# Patient Record
Sex: MALE | Race: WHITE | NOT HISPANIC OR LATINO | Employment: UNEMPLOYED | URBAN - METROPOLITAN AREA
[De-identification: names, ages, dates, MRNs, and addresses within clinical notes are randomized per-mention and may not be internally consistent; named-entity substitution may affect disease eponyms.]

---
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
## 2022-10-07 ENCOUNTER — HOSPITAL ENCOUNTER (EMERGENCY)
Facility: HOSPITAL | Age: 22
Discharge: HOME | End: 2022-10-07
Attending: EMERGENCY MEDICINE
Payer: COMMERCIAL

## 2022-10-07 ENCOUNTER — APPOINTMENT (OUTPATIENT)
Dept: RADIOLOGY | Facility: HOSPITAL | Age: 22
End: 2022-10-07
Payer: COMMERCIAL

## 2022-10-07 VITALS
BODY MASS INDEX: 17.61 KG/M2 | HEIGHT: 72 IN | OXYGEN SATURATION: 100 % | RESPIRATION RATE: 18 BRPM | SYSTOLIC BLOOD PRESSURE: 115 MMHG | DIASTOLIC BLOOD PRESSURE: 65 MMHG | HEART RATE: 82 BPM | WEIGHT: 130 LBS | TEMPERATURE: 98.2 F

## 2022-10-07 DIAGNOSIS — R07.89 INTERMITTENT LEFT-SIDED CHEST PAIN: Primary | ICD-10-CM

## 2022-10-07 LAB
ALBUMIN SERPL BCP-MCNC: 4.3 G/DL (ref 3.5–5)
ALP SERPL-CCNC: 100 U/L (ref 46–116)
ALT SERPL W P-5'-P-CCNC: 19 U/L (ref 12–78)
ANION GAP SERPL CALCULATED.3IONS-SCNC: 8 MMOL/L (ref 4–13)
AST SERPL W P-5'-P-CCNC: 23 U/L (ref 5–45)
ATRIAL RATE: 96 BPM
BASOPHILS # BLD AUTO: 0.03 THOUSANDS/ΜL (ref 0–0.1)
BASOPHILS NFR BLD AUTO: 1 % (ref 0–1)
BILIRUB DIRECT SERPL-MCNC: 0.19 MG/DL (ref 0–0.2)
BILIRUB SERPL-MCNC: 0.89 MG/DL (ref 0.2–1)
BUN SERPL-MCNC: 13 MG/DL (ref 5–25)
CALCIUM SERPL-MCNC: 8.7 MG/DL (ref 8.3–10.1)
CARDIAC TROPONIN I PNL SERPL HS: <2 NG/L
CHLORIDE SERPL-SCNC: 102 MMOL/L (ref 96–108)
CO2 SERPL-SCNC: 26 MMOL/L (ref 21–32)
CREAT SERPL-MCNC: 0.8 MG/DL (ref 0.6–1.3)
EOSINOPHIL # BLD AUTO: 0.08 THOUSAND/ΜL (ref 0–0.61)
EOSINOPHIL NFR BLD AUTO: 2 % (ref 0–6)
ERYTHROCYTE [DISTWIDTH] IN BLOOD BY AUTOMATED COUNT: 11.6 % (ref 11.6–15.1)
GFR SERPL CREATININE-BSD FRML MDRD: 128 ML/MIN/1.73SQ M
GLUCOSE SERPL-MCNC: 100 MG/DL (ref 65–140)
HCT VFR BLD AUTO: 40.2 % (ref 36.5–49.3)
HGB BLD-MCNC: 13.6 G/DL (ref 12–17)
IMM GRANULOCYTES # BLD AUTO: 0.02 THOUSAND/UL (ref 0–0.2)
IMM GRANULOCYTES NFR BLD AUTO: 0 % (ref 0–2)
LYMPHOCYTES # BLD AUTO: 0.96 THOUSANDS/ΜL (ref 0.6–4.47)
LYMPHOCYTES NFR BLD AUTO: 19 % (ref 14–44)
MAGNESIUM SERPL-MCNC: 2 MG/DL (ref 1.6–2.6)
MCH RBC QN AUTO: 29.3 PG (ref 26.8–34.3)
MCHC RBC AUTO-ENTMCNC: 33.8 G/DL (ref 31.4–37.4)
MCV RBC AUTO: 87 FL (ref 82–98)
MONOCYTES # BLD AUTO: 0.5 THOUSAND/ΜL (ref 0.17–1.22)
MONOCYTES NFR BLD AUTO: 10 % (ref 4–12)
NEUTROPHILS # BLD AUTO: 3.38 THOUSANDS/ΜL (ref 1.85–7.62)
NEUTS SEG NFR BLD AUTO: 68 % (ref 43–75)
NRBC BLD AUTO-RTO: 0 /100 WBCS
P AXIS: 72 DEGREES
PLATELET # BLD AUTO: 243 THOUSANDS/UL (ref 149–390)
PMV BLD AUTO: 10.7 FL (ref 8.9–12.7)
POTASSIUM SERPL-SCNC: 4 MMOL/L (ref 3.5–5.3)
PR INTERVAL: 152 MS
PROT SERPL-MCNC: 7.8 G/DL (ref 6.4–8.4)
QRS AXIS: 92 DEGREES
QRSD INTERVAL: 90 MS
QT INTERVAL: 362 MS
QTC INTERVAL: 457 MS
RBC # BLD AUTO: 4.64 MILLION/UL (ref 3.88–5.62)
SODIUM SERPL-SCNC: 136 MMOL/L (ref 135–147)
T WAVE AXIS: 71 DEGREES
VENTRICULAR RATE: 96 BPM
WBC # BLD AUTO: 4.97 THOUSAND/UL (ref 4.31–10.16)

## 2022-10-07 PROCEDURE — 84484 ASSAY OF TROPONIN QUANT: CPT | Performed by: EMERGENCY MEDICINE

## 2022-10-07 PROCEDURE — 99285 EMERGENCY DEPT VISIT HI MDM: CPT | Performed by: EMERGENCY MEDICINE

## 2022-10-07 PROCEDURE — 80076 HEPATIC FUNCTION PANEL: CPT | Performed by: EMERGENCY MEDICINE

## 2022-10-07 PROCEDURE — 85025 COMPLETE CBC W/AUTO DIFF WBC: CPT | Performed by: EMERGENCY MEDICINE

## 2022-10-07 PROCEDURE — 93010 ELECTROCARDIOGRAM REPORT: CPT | Performed by: INTERNAL MEDICINE

## 2022-10-07 PROCEDURE — 83735 ASSAY OF MAGNESIUM: CPT | Performed by: EMERGENCY MEDICINE

## 2022-10-07 PROCEDURE — 36415 COLL VENOUS BLD VENIPUNCTURE: CPT | Performed by: EMERGENCY MEDICINE

## 2022-10-07 PROCEDURE — 96374 THER/PROPH/DIAG INJ IV PUSH: CPT

## 2022-10-07 PROCEDURE — 71046 X-RAY EXAM CHEST 2 VIEWS: CPT

## 2022-10-07 PROCEDURE — 80048 BASIC METABOLIC PNL TOTAL CA: CPT | Performed by: EMERGENCY MEDICINE

## 2022-10-07 PROCEDURE — 99285 EMERGENCY DEPT VISIT HI MDM: CPT

## 2022-10-07 PROCEDURE — 93005 ELECTROCARDIOGRAM TRACING: CPT

## 2022-10-07 PROCEDURE — 96361 HYDRATE IV INFUSION ADD-ON: CPT

## 2022-10-07 RX ORDER — KETOROLAC TROMETHAMINE 30 MG/ML
15 INJECTION, SOLUTION INTRAMUSCULAR; INTRAVENOUS ONCE
Status: COMPLETED | OUTPATIENT
Start: 2022-10-07 | End: 2022-10-07

## 2022-10-07 RX ADMIN — KETOROLAC TROMETHAMINE 15 MG: 30 INJECTION, SOLUTION INTRAMUSCULAR at 13:37

## 2022-10-07 RX ADMIN — SODIUM CHLORIDE 500 ML: 0.9 INJECTION, SOLUTION INTRAVENOUS at 13:37

## 2022-10-07 NOTE — Clinical Note
Anderson Sandhu was seen and treated in our emergency department on 10/7/2022  No restrictions            Diagnosis:     Martha Young  may return to work on return date  He may return on this date: 10/08/2022         If you have any questions or concerns, please don't hesitate to call        Micky Hardy, DO    ______________________________           _______________          _______________  Hospital Representative                              Date                                Time

## 2022-10-07 NOTE — Clinical Note
Desiree Garza was seen and treated in our emergency department on 10/7/2022  No restrictions            Diagnosis:     Demi Edwards  may return to work on return date  He may return on this date: 10/08/2022         If you have any questions or concerns, please don't hesitate to call        Anitha Loaiza DO    ______________________________           _______________          _______________  Hospital Representative                              Date                                Time

## 2022-10-07 NOTE — ED PROVIDER NOTES
History  Chief Complaint   Patient presents with    Chest Pain     C/o intermittent cp for the past 2 wks  Denies sob  Patient is a 72-year-old male with no significant past medical history, presents to the emergency department complaining of left-sided chest pain intermittent for the past 2 weeks  Patient states that 2 weeks ago he was getting over a cold at which time he was mostly having nasal congestion and coughing  He states the cough and congestion have basically resolved but about 2 weeks ago at the end of his cold symptoms he started feeling a sharp chest pain on the left side that would come and go randomly  He states he is getting the pain on and off throughout the day, approximately 3 times per day  The pain comes on suddenly, lasts a couple of seconds and then goes away  He states when it does come on he just pauses, stops taking a deep breath and then it will go away on its own  He states it sometimes is worse when he does take a deep breath  Denies any other alleviating or aggravating factors  Denies having these symptoms before  He denies any associated diaphoresis, fevers or chills, headache, dizziness or near syncope, hemoptysis, palpitations dyspnea, abdominal pain, nausea, vomiting, change in bowel habits, urinary symptoms, skin rash or color change, leg pain or swelling, extremity weakness or paresthesia or other focal neurologic deficits  Denies any personal or family history of venous thromboembolism  Denies any recent prolonged travel or immobilization  Family history of cardiac disease mostly in his grandparents  Denies smoking history        History provided by:  Patient and parent   used: No    Chest Pain  Associated symptoms: no abdominal pain, no back pain, no cough, no dizziness, no fever, no headache, no nausea, no numbness, no palpitations, no shortness of breath, not vomiting and no weakness        Prior to Admission Medications   Prescriptions Last Dose Informant Patient Reported? Taking?   mupirocin (BACTROBAN) 2 % ointment   No No   Sig: Apply topically 3 (three) times a day for 10 days      Facility-Administered Medications: None       History reviewed  No pertinent past medical history  Past Surgical History:   Procedure Laterality Date    CIRCUMCISION         Family History   Problem Relation Age of Onset    No Known Problems Mother     No Known Problems Father     Diabetes Paternal Grandfather     Addiction problem Neg Hx     Mental illness Neg Hx      I have reviewed and agree with the history as documented  E-Cigarette/Vaping     E-Cigarette/Vaping Substances     Social History     Tobacco Use    Smoking status: Never Smoker    Smokeless tobacco: Never Used   Substance Use Topics    Alcohol use: Never    Drug use: Never       Review of Systems   Constitutional: Negative for chills and fever  HENT: Negative for congestion, ear pain, rhinorrhea, sore throat and tinnitus  Respiratory: Negative for cough, chest tightness, shortness of breath and wheezing  Cardiovascular: Positive for chest pain  Negative for palpitations and leg swelling  Gastrointestinal: Negative for abdominal pain, constipation, diarrhea, nausea and vomiting  Genitourinary: Negative for dysuria, flank pain, frequency and hematuria  Musculoskeletal: Negative for back pain, neck pain and neck stiffness  Skin: Negative for color change, pallor, rash and wound  Allergic/Immunologic: Negative for immunocompromised state  Neurological: Negative for dizziness, syncope, weakness, light-headedness, numbness and headaches  Hematological: Negative for adenopathy  Psychiatric/Behavioral: Negative for confusion and decreased concentration  All other systems reviewed and are negative  Physical Exam  Physical Exam  Vitals and nursing note reviewed  Constitutional:       General: He is not in acute distress  Appearance: Normal appearance   He is well-developed  He is not ill-appearing, toxic-appearing or diaphoretic  HENT:      Head: Normocephalic and atraumatic  Right Ear: External ear normal       Left Ear: External ear normal       Mouth/Throat:      Comments: Orpharyngeal exam deferred at this time due to risk of exposure to COVID-19 during current pandemic  Patient has no oropharyngeal complaints  Eyes:      Extraocular Movements: Extraocular movements intact  Conjunctiva/sclera: Conjunctivae normal    Neck:      Vascular: No JVD  Cardiovascular:      Rate and Rhythm: Normal rate and regular rhythm  Pulses: Normal pulses  Heart sounds: Normal heart sounds  No murmur heard  No friction rub  No gallop  Pulmonary:      Effort: Pulmonary effort is normal  No respiratory distress  Breath sounds: Normal breath sounds  No wheezing, rhonchi or rales  Chest:      Chest wall: No tenderness  Abdominal:      General: There is no distension  Palpations: Abdomen is soft  Tenderness: There is no abdominal tenderness  There is no guarding or rebound  Musculoskeletal:         General: No swelling or tenderness  Normal range of motion  Cervical back: Normal range of motion and neck supple  No rigidity  Right lower leg: No edema  Left lower leg: No edema  Skin:     General: Skin is warm and dry  Coloration: Skin is not pale  Findings: No erythema or rash  Neurological:      General: No focal deficit present  Mental Status: He is alert and oriented to person, place, and time  Sensory: No sensory deficit  Motor: No weakness     Psychiatric:         Mood and Affect: Mood normal          Behavior: Behavior normal          Vital Signs  ED Triage Vitals [10/07/22 1250]   Temperature Pulse Respirations Blood Pressure SpO2   98 2 °F (36 8 °C) 99 18 139/84 98 %      Temp Source Heart Rate Source Patient Position - Orthostatic VS BP Location FiO2 (%)   Oral Monitor Sitting Left arm -- Pain Score       No Pain         Vitals:    10/07/22 1250 10/07/22 1300   BP: 139/84 134/79   BP Location: Left arm    Pulse: 99 94   Resp: 18 20   Temp: 98 2 °F (36 8 °C)    TempSrc: Oral    SpO2: 98% 99%   Weight: 59 kg (130 lb)    Height: 6' (1 829 m)        Visual Acuity      ED Medications  Medications   sodium chloride 0 9 % bolus 500 mL (500 mL Intravenous New Bag 10/7/22 1337)   ketorolac (TORADOL) injection 15 mg (15 mg Intravenous Given 10/7/22 1337)       Diagnostic Studies  Results Reviewed     Procedure Component Value Units Date/Time    HS Troponin 0hr (reflex protocol) [[id number]]  (Normal) Collected: 10/07/22 1341    Lab Status: Final result Specimen: Blood from Arm, Right Updated: 10/07/22 1410     hs TnI 0hr <2 ng/L     Basic metabolic panel [[id number]] Collected: 10/07/22 1341    Lab Status: Final result Specimen: Blood from Arm, Right Updated: 10/07/22 1404     Sodium 136 mmol/L      Potassium 4 0 mmol/L      Chloride 102 mmol/L      CO2 26 mmol/L      ANION GAP 8 mmol/L      BUN 13 mg/dL      Creatinine 0 80 mg/dL      Glucose 100 mg/dL      Calcium 8 7 mg/dL      eGFR 128 ml/min/1 73sq m     Narrative:      Tanner guidelines for Chronic Kidney Disease (CKD):     Stage 1 with normal or high GFR (GFR > 90 mL/min/1 73 square meters)    Stage 2 Mild CKD (GFR = 60-89 mL/min/1 73 square meters)    Stage 3A Moderate CKD (GFR = 45-59 mL/min/1 73 square meters)    Stage 3B Moderate CKD (GFR = 30-44 mL/min/1 73 square meters)    Stage 4 Severe CKD (GFR = 15-29 mL/min/1 73 square meters)    Stage 5 End Stage CKD (GFR <15 mL/min/1 73 square meters)  Note: GFR calculation is accurate only with a steady state creatinine    Hepatic function panel [[id number]]  (Normal) Collected: 10/07/22 1341    Lab Status: Final result Specimen: Blood from Arm, Right Updated: 10/07/22 1404     Total Bilirubin 0 89 mg/dL      Bilirubin, Direct 0 19 mg/dL      Alkaline Phosphatase 100 U/L AST 23 U/L      ALT 19 U/L      Total Protein 7 8 g/dL      Albumin 4 3 g/dL     Magnesium [[id number]]  (Normal) Collected: 10/07/22 1341    Lab Status: Final result Specimen: Blood from Arm, Right Updated: 10/07/22 1404     Magnesium 2 0 mg/dL     CBC and differential [[id number]] Collected: 10/07/22 1341    Lab Status: Final result Specimen: Blood from Arm, Right Updated: 10/07/22 1346     WBC 4 97 Thousand/uL      RBC 4 64 Million/uL      Hemoglobin 13 6 g/dL      Hematocrit 40 2 %      MCV 87 fL      MCH 29 3 pg      MCHC 33 8 g/dL      RDW 11 6 %      MPV 10 7 fL      Platelets 528 Thousands/uL      nRBC 0 /100 WBCs      Neutrophils Relative 68 %      Immat GRANS % 0 %      Lymphocytes Relative 19 %      Monocytes Relative 10 %      Eosinophils Relative 2 %      Basophils Relative 1 %      Neutrophils Absolute 3 38 Thousands/µL      Immature Grans Absolute 0 02 Thousand/uL      Lymphocytes Absolute 0 96 Thousands/µL      Monocytes Absolute 0 50 Thousand/µL      Eosinophils Absolute 0 08 Thousand/µL      Basophils Absolute 0 03 Thousands/µL                  XR chest 2 views   Final Result by Rober Wilkerson MD (10/07 1409)   COPD      No acute cardiopulmonary disease                    Workstation performed: [id number]                    Procedures  ECG 12 Lead Documentation Only    Date/Time: 10/7/2022 1:20 PM  Performed by: Aliya Alvarez DO  Authorized by: Aliya Alvarez DO     ECG reviewed by me, the ED Provider: yes    Patient location:  ED  Previous ECG:     Previous ECG:  Unavailable  Rate:     ECG rate:  96    ECG rate assessment: normal    Rhythm:     Rhythm: sinus rhythm    Ectopy:     Ectopy: none    QRS:     QRS axis:  Right    QRS intervals:  Normal  Conduction:     Conduction: abnormal      Abnormal conduction: incomplete RBBB    ST segments:     ST segments:  Normal  T waves:     T waves: normal    Other findings:     Other findings: BOYD               ED Course  ED Course as of 10/07/22 1433   Fri Oct 07, 2022   1422 hs TnI 0hr: <2   [phone] Updated patient about normal workup  I recommended follow-up with PCP  Discussed symptomatic management at home for suspected musculoskeletal pain  Discussed ED return parameters               HEART Risk Score    Flowsheet Row Most Recent Value   Heart Score Risk Calculator    History 0 Filed at: 10/07/2022 1433   ECG 0 Filed at: 10/07/2022 1433   Age 0 Filed at: 10/07/2022 1433   Risk Factors 0 Filed at: 10/07/2022 1433   Troponin 0 Filed at: 10/07/2022 1433   HEART Score 0 Filed at: 10/07/2022 1433                PERC Rule for PE    Flowsheet Row Most Recent Value   PERC Rule for PE    Age >=50 0 Filed at: 10/07/2022 1321   HR >=100 0 Filed at: 10/07/2022 1321   O2 Sat on room air < 95% 0 Filed at: 10/07/2022 1321   History of PE or DVT 0 Filed at: 10/07/2022 1321   Recent trauma or surgery 0 Filed at: 10/07/2022 1321   Hemoptysis 0 Filed at: 10/07/2022 1321   Exogenous estrogen 0 Filed at: 10/07/2022 1321   Unilateral leg swelling 0 Filed at: 10/07/2022 1321   PERC Rule for PE Results 0 Filed at: 10/07/2022 1321                  Wells' Criteria for PE    Flowsheet Row Most Recent Value   Wells' Criteria for PE    Clinical signs and symptoms of DVT 0 Filed at: 10/07/2022 1321   PE is primary diagnosis or equally likely 0 Filed at: 10/07/2022 1321   HR >100 0 Filed at: 10/07/2022 1321   Immobilization at least 3 days or Surgery in the previous 4 weeks 0 Filed at: 10/07/2022 1321   Previous, objectively diagnosed PE or DVT 0 Filed at: 10/07/2022 1321   Hemoptysis 0 Filed at: 10/07/2022 1321   Malignancy with treatment within 6 months or palliative 0 Filed at: 10/07/2022 1321   Wells' Criteria Total 0 Filed at: 10/07/2022 1321                MDM  Number of Diagnoses or Management Options  Diagnosis management comments: 72-year-old male presents to the ED with sharp fleeting left-sided chest pain on and off for the past 2 weeks after getting over a cold and cough  Differential includes musculoskeletal pain, costochondritis, anxiety, pericarditis  Overall low suspicion for pneumothorax, acute PE or ACS  Will workup with cardiac labs, EKG and chest x-ray  Will give Toradol for anti-inflammatory effects and for pain  Amount and/or Complexity of Data Reviewed  Clinical lab tests: reviewed and ordered  Tests in the radiology section of CPT®: ordered and reviewed  Tests in the medicine section of CPT®: ordered and reviewed  Independent visualization of images, tracings, or specimens: yes        Disposition  Final diagnoses:   Intermittent left-sided chest pain     Time reflects when diagnosis was documented in both MDM as applicable and the Disposition within this note     Time User Action Codes Description Comment    10/7/2022  2:32 PM Tasiaasmmi Zavalaen Add [R07 89] Intermittent left-sided chest pain       ED Disposition     ED Disposition   Discharge    Condition   Stable    Date/Time   Fri Oct 7, 2022  2:32 PM    [address] [facility] discharge to home/self care  Follow-up Information     Follow up With Specialties Details Why Contact Info Additional Information    Lesvia Pacheco MD Pediatrics Schedule an appointment as soon as possible for a visit   [address] PERRY [facility] Emergency Department Emergency Medicine Go to  If symptoms worsen [address] Emergency Department, [address], [facility]          Patient's Medications   Discharge Prescriptions    No medications on file       No discharge procedures on file      PDMP Review     None          ED Provider  Electronically Signed by           Char Winkler DO  10/07/22 9732